# Patient Record
Sex: FEMALE | Race: WHITE
[De-identification: names, ages, dates, MRNs, and addresses within clinical notes are randomized per-mention and may not be internally consistent; named-entity substitution may affect disease eponyms.]

---

## 2019-02-17 ENCOUNTER — HOSPITAL ENCOUNTER (EMERGENCY)
Dept: HOSPITAL 97 - ER | Age: 59
Discharge: HOME | End: 2019-02-17
Payer: SELF-PAY

## 2019-02-17 DIAGNOSIS — R00.2: Primary | ICD-10-CM

## 2019-02-17 DIAGNOSIS — R10.13: ICD-10-CM

## 2019-02-17 DIAGNOSIS — F32.9: ICD-10-CM

## 2019-02-17 DIAGNOSIS — I10: ICD-10-CM

## 2019-02-17 DIAGNOSIS — F41.9: ICD-10-CM

## 2019-02-17 LAB
ALBUMIN SERPL BCP-MCNC: 4 G/DL (ref 3.4–5)
ALP SERPL-CCNC: 85 U/L (ref 45–117)
ALT SERPL W P-5'-P-CCNC: 23 U/L (ref 12–78)
AST SERPL W P-5'-P-CCNC: 16 U/L (ref 15–37)
BUN BLD-MCNC: 21 MG/DL (ref 7–18)
GLUCOSE SERPLBLD-MCNC: 99 MG/DL (ref 74–106)
HCT VFR BLD CALC: 40.1 % (ref 36–45)
INR BLD: 0.91
LIPASE SERPL-CCNC: 226 U/L (ref 73–393)
LYMPHOCYTES # SPEC AUTO: 2 K/UL (ref 0.7–4.9)
MAGNESIUM SERPL-MCNC: 2 MG/DL (ref 1.8–2.4)
NT-PROBNP SERPL-MCNC: 45 PG/ML (ref ?–125)
PMV BLD: 8.4 FL (ref 7.6–11.3)
POTASSIUM SERPL-SCNC: 3.9 MMOL/L (ref 3.5–5.1)
RBC # BLD: 3.9 M/UL (ref 3.86–4.86)
TROPONIN (EMERG DEPT USE ONLY): < 0.02 NG/ML (ref 0–0.04)

## 2019-02-17 PROCEDURE — 84484 ASSAY OF TROPONIN QUANT: CPT

## 2019-02-17 PROCEDURE — 74177 CT ABD & PELVIS W/CONTRAST: CPT

## 2019-02-17 PROCEDURE — 93005 ELECTROCARDIOGRAM TRACING: CPT

## 2019-02-17 PROCEDURE — 80048 BASIC METABOLIC PNL TOTAL CA: CPT

## 2019-02-17 PROCEDURE — 99285 EMERGENCY DEPT VISIT HI MDM: CPT

## 2019-02-17 PROCEDURE — 36415 COLL VENOUS BLD VENIPUNCTURE: CPT

## 2019-02-17 PROCEDURE — 71045 X-RAY EXAM CHEST 1 VIEW: CPT

## 2019-02-17 PROCEDURE — 83690 ASSAY OF LIPASE: CPT

## 2019-02-17 PROCEDURE — 76705 ECHO EXAM OF ABDOMEN: CPT

## 2019-02-17 PROCEDURE — 85025 COMPLETE CBC W/AUTO DIFF WBC: CPT

## 2019-02-17 PROCEDURE — 80076 HEPATIC FUNCTION PANEL: CPT

## 2019-02-17 PROCEDURE — 83880 ASSAY OF NATRIURETIC PEPTIDE: CPT

## 2019-02-17 PROCEDURE — 83735 ASSAY OF MAGNESIUM: CPT

## 2019-02-17 PROCEDURE — 85610 PROTHROMBIN TIME: CPT

## 2019-02-17 NOTE — RAD REPORT
EXAM DESCRIPTION:  Julio Single View2/17/2019 12:41 pm

 

CLINICAL HISTORY:  Chest pain

 

COMPARISON:  2017

 

FINDINGS:   The lungs appear clear of acute infiltrate. The heart is normal size

 

IMPRESSION:   No acute abnormalities displayed

## 2019-02-17 NOTE — ER
Nurse's Notes                                                                                     

 [facility]                                                                

Name: Mariya Saleh                                                                               

Age: 58 yrs                                                                                       

Sex: Female                                                                                       

: 1960                                                                                   

MRN: [id number]                                                                                   

Arrival Date: 2019                                                                          

Time: 11:51                                                                                       

Account#: [id number]                                                                            

Bed 5                                                                                             

Private MD: Parish Gilmore T                                                                        

Diagnosis: Chest pain, unspecified;Epigastric pain;Palpitations                                   

                                                                                                  

Presentation:                                                                                     

                                                                                             

12:10 Presenting complaint: Patient states: pain across chest since this morning, also has    iw  

      had palpitations for a few days, pain radiates to right jaw, into jason armpits, also c/o     

      upper abd pain. Transition of care: patient was not received from another setting of        

      care. Onset of symptoms was 2019. Risk Assessment: Do you want to hurt         

      yourself or someone else? Patient reports no desire to harm self or others. Initial         

      Sepsis Screen: Does the patient meet any 2 criteria? No. Patient's initial sepsis           

      screen is negative. Does the patient have a suspected source of infection? No.              

      Patient's initial sepsis screen is negative. Care prior to arrival: None.                   

12:10 Method Of Arrival: Wheelchair                                                           iw  

12:10 Acuity: MORIAH 2                                                                           iw  

                                                                                                  

Historical:                                                                                       

- Allergies:                                                                                      

12:12 No Known Allergies;                                                                     iw  

- Home Meds:                                                                                      

12:12 citalopram 10 mg tab 1 tab once daily [Active]; Toprol  mg Oral Tb24 1 tab once   iw  

      daily [Active]; Alprazolam Oral as needed [Active];                                         

- PMHx:                                                                                           

12:12 Anxiety; Depression; Hypertension;                                                      iw  

- PSHx:                                                                                           

12:12 Hysterectomy;                                                                           iw  

                                                                                                  

- Immunization history:: Adult Immunizations.                                                     

- Social history:: Smoking status: Patient uses tobacco products, smokes one pack                 

  cigarettes per day.                                                                             

- Ebola Screening: : Patient negative for fever greater than or equal to 101.5 degrees            

  Fahrenheit, and additional compatible Ebola Virus Disease symptoms Patient denies               

  exposure to infectious person Patient denies travel to an Ebola-affected area in the            

  21 days before illness onset No symptoms or risks identified at this time.                      

                                                                                                  

                                                                                                  

Screenin:40 Abuse screen: Denies threats or abuse. Denies injuries from another. Nutritional        ph  

      screening: No deficits noted. Tuberculosis screening: No symptoms or risk factors           

      identified. Fall Risk None identified.                                                      

                                                                                                  

Assessment:                                                                                       

12:30 General: Appears in no apparent distress. comfortable, slender, well groomed, Behavior  ph  

      is calm, cooperative, appropriate for age, Denies fever, feeling ill. Pain: Complains       

      of pain in anterior aspect of left upper chest and left breast Pain radiates to jason         

      axilla, jason jaw, jason rib area,upper abdomen and back Pain currently is 7 out of 10 on a     

      pain scale. Pain began 1 day ago. Neuro: Level of Consciousness is awake, alert, obeys      

      commands, Oriented to person, place, time, situation, Reports headache in left frontal      

      area. Cardiovascular: Reports chest pain, nausea, palpitations, shortness of breath,        

      Denies syncope, vomiting, Capillary refill < 3 seconds in bilateral fingers Patient's       

      skin is warm and dry. Rhythm is sinus rhythm Chest pain is located in left chest wall       

      radiates to bilateral arm(s) back jaw(s). Respiratory: Airway is patent Respiratory         

      effort is even, unlabored. GI: Reports nausea, Patient currently denies abdominal pain,     

      diarrhea, vomiting. Derm: Skin is intact, is healthy with good turgor, Skin is pink,        

      warm \T\ dry. Musculoskeletal: Circulation, motion, and sensation intact. Range of          

      motion: intact in all extremities.                                                          

12:46 Reassessment: Patient appears in no apparent distress at this time. Patient and/or      ph  

      family updated on plan of care and expected duration. Pain level reassessed. Patient is     

      alert, oriented x 3, equal unlabored respirations, skin warm/dry/pink. Pt reports that      

      nausea and pain have improved, states, " I only have pain in my back now." Awaiting lab     

      and radiology results, VSS Patient states feeling better.                                   

14:11 Reassessment: Patient appears in no apparent distress at this time. Patient and/or      ph  

      family updated on plan of care and expected duration. Pain level reassessed. Patient is     

      alert, oriented x 3, equal unlabored respirations, skin warm/dry/pink.                      

15:20 Reassessment: Patient appears in no apparent distress at this time. Patient and/or      ph  

      family updated on plan of care and expected duration. Pain level reassessed. Patient is     

      alert, oriented x 3, equal unlabored respirations, skin warm/dry/pink. Pt c/o pain in       

      back and jason rib area, states, " I am feeling the palpations again." EKG repeated and       

      ERP notified of pt's pain, see MAR.                                                         

15:39 Reassessment: Patient appears in no apparent distress at this time. Patient is alert,   ph  

      oriented x 3, equal unlabored respirations, skin warm/dry/pink. Hospitalist at bedside      

      to exam pt.                                                                                 

17:19 Reassessment: Patient appears in no apparent distress at this time. Patient and/or      ph  

      family updated on plan of care and expected duration. Pain level reassessed. Patient is     

      alert, oriented x 3, equal unlabored respirations, skin warm/dry/pink. Pt reports that      

      pain has improved, awaiting results of repeat cardiac enzymes, VSS.                         

                                                                                                  

Vital Signs:                                                                                      

12:12  / 93; Pulse 96; Resp 18 S; Temp 98.3; Pulse Ox 100% on R/A; Weight 58.97 kg;     iw  

      Height 5 ft. 2 in. (157.48 cm); Pain 7/10;                                                  

12:46  / 82; Pulse 85; Resp 22; Pulse Ox 96% on R/A; Pain 3/10;                         ph  

14:11  / 98; Pulse 83; Resp 18; Pulse Ox 97% on R/A;                                    ph  

15:37  / 92; Pulse 78; Resp 20; Pulse Ox 98% on R/A;                                    ph  

17:00  / 92; Pulse 78; Resp 18; Pulse Ox 99% on R/A;                                    ph  

17:54  / 83; Pulse 76; Resp 18; Pulse Ox 100% on R/A;                                   hj  

12:12 Body Mass Index 23.78 (58.97 kg, 157.48 cm)                                             iw  

                                                                                                  

ED Course:                                                                                        

11:51 Patient arrived in ED.                                                                  mr  

11:51 Parish Gilmore MD is Private Physician.                                                   mr  

11:55 Dina Maxwell, RN is Primary Nurse.                                                    ph  

12:04 Lars Joe PA is PHCP.                                                                cp  

12:04 John Pleitez MD is Attending Physician.                                              cp  

12:11 Triage completed.                                                                       iw  

12:12 Arm band placed on.                                                                     iw  

12:24 Inserted saline lock: 20 gauge in right antecubital area, using aseptic technique.      ph  

      Blood collected. Patient maintains SpO2 saturation greater than 95% on room air.            

12:41 XRAY Chest (1 view) In Process Unspecified.                                             EDMS

12:47 Patient has correct armband on for positive identification. Placed in gown. Bed in low  ph  

      position. Call light in reach. Side rails up X2. Cardiac monitor on. Pulse ox on. NIBP      

      on. Door closed. Noise minimized. Lights dimmed. Warm blanket given.                        

14:11 US Abdomen Limited: RUQ/epigastric pain In Process Unspecified.                         EDMS

14:32 CT completed. Patient tolerated procedure well. Patient moved back from CT.             kw1 

14:34 CT Abd/Pelvis - W/Contrast: no oral contrast In Process Unspecified.                    EDMS

17:47 Adi Baron MD is Referral Physician.                                                cp  

17:54 No provider procedures requiring assistance completed. IV discontinued, intact,         hj  

      bleeding controlled, No redness/swelling at site. Pressure dressing applied.                

                                                                                                  

Administered Medications:                                                                         

12:35 Drug: Zofran 4 mg Route: IVP; Site: right antecubital;                                  ph  

12:48 Follow up: Response: No adverse reaction; Nausea is decreased                           ph  

12:35 Drug: ProTONIX 40 mg Route: IVP; Site: left antecubital;                                ph  

12:49 Follow up: Response: No adverse reaction                                                ph  

12:37 Drug: morphine 2 mg Route: IVP; Site: right antecubital;                                ph  

12:49 Follow up: Response: No adverse reaction; Pain is decreased                             ph  

16:05 Not Given (Other Intervention Used; Pt took 2 at home, 2 administered in ED): Aspirin   ph  

      Chewable Tablet 324 mg PO once; 81 mg tablets x 4                                           

16:05 Drug: Nicotine 21 mg/24 hr 1 patches {Note: R upper arm.} Route: Transdermal; Site:     ph  

      affected area;                                                                              

16:06 Drug: morphine 2 mg Route: IVP; Site: right antecubital;                                ph  

17:55 Follow up: Response: No adverse reaction                                                hj  

16:06 Drug: Aspirin Chewable Tablet 162 mg Route: PO;                                         ph  

17:55 Follow up: Response: No adverse reaction; Pain is decreased                             hj  

                                                                                                  

                                                                                                  

Outcome:                                                                                          

17:48 Discharge ordered by MD.                                                                cp  

17:54 Discharged to home ambulatory, with family.                                             hj  

17:54 Condition: stable                                                                           

17:54 Discharge instructions given to patient, family, Instructed on discharge instructions,      

      follow up and referral plans. medication usage, Demonstrated understanding of               

      instructions, follow-up care, medications, Prescriptions given X 2.                         

17:57 Patient left the ED.                                                                      

                                                                                                  

Signatures:                                                                                       

Dispatcher [employer]                           [facility]                                                 

SowPatricia Irene, RN RN                                                      

Dina Maxwell RN RN ph Joaquin, Henry, RN RN hj Page, Corey, PA PA cp Wilhelm, Jackeline                            kw1                                                  

                                                                                                  

Corrections: (The following items were deleted from the chart)                                    

12:13 12:12  / 93; Pulse 96bpm; Resp 18bpm; Spontaneous; Pulse Ox 100% RA; Temp 98.3F;  iw  

      58.97 kg; Height 5 ft. 2 in.; BMI: 23.7; Pain 7/10; iw                                      

                                                                                                  

**************************************************************************************************

## 2019-02-17 NOTE — RAD REPORT
EXAM DESCRIPTION:  CT - Abdomen   Pelvis W Contrast - 2/17/2019 2:33 pm

 

CLINICAL HISTORY:  Abdominal pain with nausea.

 

COMPARISON:  none.

 

TECHNIQUE:  Computed axial tomography of the abdomen pelvis was obtained. 100 cc Isovue-300 was admin
istered intravenously. Oral contrast was not requested which limits evaluation of bowel.

 

All CT scans are performed using dose optimization technique as appropriate and may include automated
 exposure control or mA/KV adjustment according to patient size.

 

FINDINGS:  The liver, spleen, pancreas, adrenal and kidneys appear unremarkable.

 

There is no evidence of diverticulitis. The appendix is normal. Hysterectomy has been performed

 

An adnexal mass is not visualized

 

IMPRESSION:  No acute abnormality is displayed.

## 2019-02-17 NOTE — EDPHYS
Physician Documentation                                                                           

 [facility]                                                                

Name: Mariya Saleh                                                                               

Age: 58 yrs                                                                                       

Sex: Female                                                                                       

: 1960                                                                                   

MRN: [id number]                                                                                   

Arrival Date: 2019                                                                          

Time: 11:51                                                                                       

Account#: [id number]                                                                            

Bed 5                                                                                             

Private MD: Parish Gilmore T                                                                        

ED Physician John Pleitez                                                                       

HPI:                                                                                              

                                                                                             

12:16 This 58 yrs old  Female presents to ER via Wheelchair with complaints of Chest cp  

      Pain, Palpitations.                                                                         

12:16 The patient or guardian reports chest pain that is located primarily in the epigastric  cp  

      area, mid and lower chest bilateral.                                                        

12:16 Onset: this morning.                                                                    cp  

12:16 The patient presents with abdominal pain in the upper abdomen.                          cp  

12:16 The pain radiates to jaw, bilateral axilla. Associated signs and symptoms: Pertinent    cp  

      positives: nausea, palpitations, Pertinent negatives: blood in stools, constipation,        

      diarrhea, fever, shortness of breath, vomiting. The symptoms are described as constant.     

12:16 The chest pain is described as aching.                                                  cp  

                                                                                                  

Historical:                                                                                       

- Allergies:                                                                                      

12:12 No Known Allergies;                                                                     iw  

- Home Meds:                                                                                      

12:12 citalopram 10 mg tab 1 tab once daily [Active]; Toprol  mg Oral Tb24 1 tab once   iw  

      daily [Active]; Alprazolam Oral as needed [Active];                                         

- PMHx:                                                                                           

12:12 Anxiety; Depression; Hypertension;                                                      iw  

- PSHx:                                                                                           

12:12 Hysterectomy;                                                                           iw  

                                                                                                  

- Immunization history:: Adult Immunizations.                                                     

- Social history:: Smoking status: Patient uses tobacco products, smokes one pack                 

  cigarettes per day.                                                                             

- Ebola Screening: : Patient negative for fever greater than or equal to 101.5 degrees            

  Fahrenheit, and additional compatible Ebola Virus Disease symptoms Patient denies               

  exposure to infectious person Patient denies travel to an Ebola-affected area in the            

  21 days before illness onset No symptoms or risks identified at this time.                      

                                                                                                  

                                                                                                  

ROS:                                                                                              

12:19 Constitutional: Negative for body aches, chills, fever, poor PO intake.                 cp  

12:19 Eyes: Negative for injury, pain, redness, and discharge.                                cp  

12:19 ENT: Negative for drainage from ear(s), ear pain, sore throat, difficulty swallowing,       

      difficulty handling secretions.                                                             

12:19 Cardiovascular: Positive for chest pain, palpitations, Negative for edema.                  

12:19 Respiratory: Negative for cough, shortness of breath, wheezing.                             

12:19 Abdomen/GI: Positive for abdominal pain, nausea, Negative for vomiting, diarrhea,           

      constipation, black/tarry stool, rectal bleeding.                                           

12:19 Back: Positive for pain at rest, pain with movement, of the left scapular area and          

      right scapular area.                                                                        

12:19 : Negative for urinary symptoms.                                                          

12:19 Skin: Negative for cellulitis, rash.                                                        

12:19 Neuro: Negative for altered mental status, headache, syncope, near syncope, weakness.       

12:19 All other systems are negative.                                                             

                                                                                                  

Exam:                                                                                             

12:15 ECG was reviewed by the Attending Physician.                                            cp  

12:22 Constitutional: The patient appears in no acute distress, alert, awake,                 cp  

      non-diaphoretic, non-toxic, well developed, well nourished, uncomfortable.                  

12:22 Head/Face:  Normocephalic, atraumatic. Eyes:  Pupils equal round and reactive to light, cp  

      extra-ocular motions intact.  Lids and lashes normal.  Conjunctiva and sclera are           

      non-icteric and not injected.  Cornea within normal limits.  Periorbital areas with no      

      swelling, redness, or edema. ENT:  Nares patent. No nasal discharge, no septal              

      abnormalities noted.  Tympanic membranes are normal and external auditory canals are        

      clear.  Oropharynx with no redness, swelling, or masses, exudates, or evidence of           

      obstruction, uvula midline.  Mucous membranes moist. Neck:  Trachea midline, no             

      thyromegaly or masses palpated, and no cervical lymphadenopathy.  Supple, full range of     

      motion without nuchal rigidity, or vertebral point tenderness.  No Meningismus.             

      Chest/axilla:  Normal chest wall appearance and motion.  Nontender with no deformity.       

      No lesions are appreciated.                                                                 

12:22 Cardiovascular: Rate: normal, Rhythm: regular, Pulses: Pulses are 2+ in right radial        

      artery and left radial artery. Heart sounds: murmur, not appreciated, rub, not              

      appreciated, gallop, not appreciated, Edema: is not appreciated, JVD: is not                

      appreciated.                                                                                

12:22 Respiratory: the patient does not display signs of respiratory distress,  Respirations:     

      normal, no use of accessory muscles, no retractions, no splinting, no tachypnea,            

      labored breathing, is not present, Breath sounds: are clear throughout, no decreased        

      breath sounds, no stridor, no wheezing.                                                     

12:22 Abdomen/GI: Inspection: abdomen appears normal, Bowel sounds: active, all quadrants,        

      Palpation: soft, in all quadrants, moderate abdominal tenderness, in the epigastric         

      area, rebound tenderness, is not appreciated, voluntary guarding, is elicited in the        

      epigastric area.                                                                            

12:22 Back: pain, that is mild, of the  left scapular area and right scapular area.               

12:22 Musculoskeletal/extremity: Exam is negative for decreased range of motion, deformity,       

      injury.                                                                                     

12:22 Skin: cellulitis, is not appreciated, no rash present.                                      

12:22 Neuro: Orientation: to person, place \T\ time. Mentation: is normal, Cerebellar function:   

      is grossly normal, Motor: moves all fours, strength is normal, Sensation: is normal.        

                                                                                                  

Vital Signs:                                                                                      

12:12  / 93; Pulse 96; Resp 18 S; Temp 98.3; Pulse Ox 100% on R/A; Weight 58.97 kg;     iw  

      Height 5 ft. 2 in. (157.48 cm); Pain 7/10;                                                  

12:46  / 82; Pulse 85; Resp 22; Pulse Ox 96% on R/A; Pain 3/10;                         ph  

14:11  / 98; Pulse 83; Resp 18; Pulse Ox 97% on R/A;                                    ph  

15:37  / 92; Pulse 78; Resp 20; Pulse Ox 98% on R/A;                                    ph  

17:00  / 92; Pulse 78; Resp 18; Pulse Ox 99% on R/A;                                    ph  

17:54  / 83; Pulse 76; Resp 18; Pulse Ox 100% on R/A;                                   hj  

12:12 Body Mass Index 23.78 (58.97 kg, 157.48 cm)                                             iw  

                                                                                                  

MDM:                                                                                              

12:09 Patient medically screened.                                                             cp  

15:00 Data reviewed: vital signs, nurses notes, lab test result(s), EKG, radiologic studies,  cp  

      CT scan, plain films, ultrasound.                                                           

15:00 Test interpretation: by ED physician or midlevel provider: ECG, plain radiologic        cp  

      studies.                                                                                    

15:15 Physician consultation: Martha Villalpando MD was called at 15:05, was contacted at 15:05,  cp  

      regarding admission, to the telemetry unit. patient's condition, and will see patient       

      in ED, shortly.                                                                             

15:35 ED course: VSS. Patient evaluated by DR Zangana in ED and felt stable for discharge     cp  

      with outpatient f/u after repeat troponin and if troponin is negative. Does not believe     

      symptoms are cardiac related.                                                               

17:45 The patient was given aspirin in the Emergency Department.                              cp  

17:45 Counseling: I had a detailed discussion with the patient and/or guardian regarding: the cp  

      historical points, exam findings, and any diagnostic results supporting the                 

      discharge/admit diagnosis, the presence of at least one elevated blood pressure reading     

      (>120/80) during this emergency department visit, lab results, radiology results, the       

      need for outpatient follow up, a cardiologist, to return to the emergency department if     

      symptoms worsen or persist or if there are any questions or concerns that arise at          

      home, smoking cessation.                                                                    

                                                                                                  

                                                                                             

12:12 Order name: Basic Metabolic Panel; Complete Time: 13:11                                   

                                                                                             

12:12 Order name: CBC with Diff; Complete Time: 12:59                                         cp  

                                                                                             

12:59 Interpretation: Normal except: WBC 12.4; .9; ; NEUT A 8.9.                cp  

                                                                                             

12:12 Order name: LFT's; Complete Time: 13:11                                                 cp  

                                                                                             

12:12 Order name: Magnesium; Complete Time: 13:11                                             cp  

                                                                                             

12:12 Order name: NT PRO-BNP; Complete Time: 13:11                                            cp  

                                                                                             

12:12 Order name: PT-INR; Complete Time: 12:59                                                cp  

                                                                                             

12:12 Order name: Troponin (emerg Dept Use Only); Complete Time: 13:11                        cp  

                                                                                             

12:12 Order name: XRAY Chest (1 view); Complete Time: 13:11                                   cp  

                                                                                             

12:12 Order name: Lipase; Complete Time: 13:11                                                cp  

                                                                                             

13:12 Order name: US Abdomen Limited: RUQ/epigastric pain; Complete Time: 14:48               cp  

                                                                                             

14:09 Order name: CT Abd/Pelvis - W/Contrast: no oral contrast; Complete Time: 14:48          cp  

                                                                                             

16:17 Order name: Troponin I                                                                  cp  

                                                                                             

12:12 Order name: EKG; Complete Time: 12:13                                                   cp  

                                                                                             

12:12 Order name: Cardiac monitoring; Complete Time: 12:14                                    cp  

                                                                                             

12:12 Order name: EKG - Nurse/Tech; Complete Time: 12:14                                      cp  

                                                                                             

12:12 Order name: IV Saline Lock; Complete Time: 12:24                                        cp  

                                                                                             

12:12 Order name: Labs collected and sent; Complete Time: 12:24                               cp  

                                                                                             

12:12 Order name: O2 Per Protocol; Complete Time: 12:14                                       cp  

                                                                                             

12:12 Order name: O2 Sat Monitoring; Complete Time: 12:14                                     cp  

                                                                                             

15:05 Order name: Diet Regular; Complete Time: 15:05                                          cp  

                                                                                                  

EC:15 Rate is 86 beats/min. Rhythm is regular. KS interval is normal. QRS interval is normal. cp  

      QT interval is normal. T waves are Inverted in lead V2. Interpreted by me. Reviewed by      

      me.                                                                                         

                                                                                                  

Administered Medications:                                                                         

12:35 Drug: Zofran 4 mg Route: IVP; Site: right antecubital;                                  ph  

12:48 Follow up: Response: No adverse reaction; Nausea is decreased                           ph  

12:35 Drug: ProTONIX 40 mg Route: IVP; Site: left antecubital;                                ph  

12:49 Follow up: Response: No adverse reaction                                                ph  

12:37 Drug: morphine 2 mg Route: IVP; Site: right antecubital;                                ph  

12:49 Follow up: Response: No adverse reaction; Pain is decreased                             ph  

16:05 Not Given (Other Intervention Used; Pt took 2 at home, 2 administered in ED): Aspirin   ph  

      Chewable Tablet 324 mg PO once; 81 mg tablets x 4                                           

16:05 Drug: Nicotine 21 mg/24 hr 1 patches {Note: R upper arm.} Route: Transdermal; Site:     ph  

      affected area;                                                                              

16:06 Drug: morphine 2 mg Route: IVP; Site: right antecubital;                                ph  

17:55 Follow up: Response: No adverse reaction                                                hj  

16:06 Drug: Aspirin Chewable Tablet 162 mg Route: PO;                                         ph  

17:55 Follow up: Response: No adverse reaction; Pain is decreased                             hj  

                                                                                                  

                                                                                                  

Disposition:                                                                                      

18:55 Co-signature as Attending Physician, John Pleitez MD.                                    

                                                                                                  

Disposition:                                                                                      

19 17:48 Discharged to Home. Impression: Chest pain, unspecified, Epigastric pain,          

  Palpitations.                                                                                   

- Condition is Stable.                                                                            

- Discharge Instructions: Nonspecific Chest Pain, Palpitations, Aspirin and Your Heart.           

- Prescriptions for Celexa 20 mg Oral Tablet - take 0.5 tablet by ORAL route once                 

  daily; 20 tablet. Protonix 40 mg Oral Tablet - take 1 tablet by ORAL route once                 

  daily; 30 tablet.                                                                               

- Medication Reconciliation Form, Thank You Letter, Antibiotic Education, Prescription            

  Opioid Use form.                                                                                

- Follow up: Adi Baron MD; When: 2 - 3 days; Reason: chest pain.                             

- Problem is new.                                                                                 

- Symptoms have improved.                                                                         

                                                                                                  

                                                                                                  

                                                                                                  

Signatures:                                                                                       

Dispatcher [employer]                           EDKary Bagley RN                     RN   iw                                                   

Dina Maxwell RN                      RN   ph                                                   

Wilfredo Tian RN                      RN   hj                                                   

Lars [place], PA                         PA   cp                                                   

John Pleitez MD MD                                                      

                                                                                                  

Corrections: (The following items were deleted from the chart)                                    

12:59 12:59 Normal except: WBC 12.4; .9; . cp                                   cp  

17:48 17:48 2019 17:48 Discharged to Home. Impression: Chest pain, unspecified;         cp  

      Epigastric pain. Condition is Stable. Prescriptions for Celexa 20 mg Oral Tablet - take     

      0.5 tablet by ORAL route once daily; 20 tablet. and Forms are Medication Reconciliation     

      Form, Thank You Letter, Antibiotic Education, Prescription Opioid Use. Follow up: Adi Baron; When: 2 - 3 days; Reason: chest pain. Problem is new. Symptoms have improved. cp    

17:57 17:48 2019 17:48 Discharged to Home. Impression: Chest pain, unspecified;         hj  

      Epigastric pain; Palpitations. Condition is Stable. Discharge Instructions: Nonspecific     

      Chest Pain, Palpitations, Aspirin and Your Heart. Prescriptions for Celexa 20 mg Oral       

      Tablet - take 0.5 tablet by ORAL route once daily; 20 tablet, Protonix 40 mg Oral           

      Tablet - take 1 tablet by ORAL route once daily; 30 tablet. and Forms are Medication        

      Reconciliation Form, Thank You Letter, Antibiotic Education, Prescription Opioid Use.       

      Follow up: Adi Baron; When: 2 - 3 days; Reason: chest pain. Problem is new. Symptoms     

      have improved. cp                                                                           

                                                                                                  

**************************************************************************************************

## 2019-02-17 NOTE — RAD REPORT
EXAM DESCRIPTION:  US - Abdomen Exam Limited - 2/17/2019 2:10 pm

 

CLINICAL HISTORY:  Abdominal pain.

 

COMPARISON:  None.

 

FINDINGS:  The gallbladder is contracted. The gallbladder wall is not thickened. A gallstone is not s
een.

 

The common bile duct measures 6 millimeters

 

IMPRESSION:  Contracted gallbladder. Otherwise unremarkable exam

## 2019-02-18 NOTE — EKG
Test Date:    2019-02-17               Test Time:    15:24:34

Technician:   ARGELIA                                    

                                                     

MEASUREMENT RESULTS:                                       

Intervals:                                           

Rate:         73                                     

PA:           184                                    

QRSD:         78                                     

QT:           402                                    

QTc:          442                                    

Axis:                                                

P:            61                                     

PA:           184                                    

QRS:          10                                     

T:            40                                     

                                                     

INTERPRETIVE STATEMENTS:                                       

                                                     

Normal sinus rhythm

Normal ECG

Compared to ECG 02/17/2019 12:05:33

No significant changes



Electronically Signed On 02-18-19 08:51:59 CST by Adi Baron

## 2019-02-18 NOTE — EKG
Test Date:    2019-02-17               Test Time:    12:05:33

Technician:   ZARA                                    

                                                     

MEASUREMENT RESULTS:                                       

Intervals:                                           

Rate:         89                                     

HI:           174                                    

QRSD:         86                                     

QT:           378                                    

QTc:          459                                    

Axis:                                                

P:            72                                     

HI:           174                                    

QRS:          19                                     

T:            54                                     

                                                     

INTERPRETIVE STATEMENTS:                                       

                                                     

Normal sinus rhythm

Possible Left atrial enlargement

Borderline ECG

Compared to ECG 02/28/2017 19:24:35

No significant changes



Electronically Signed On 02-18-19 07:37:41 CST by Jaen Enriquez

## 2020-08-06 ENCOUNTER — HOSPITAL ENCOUNTER (EMERGENCY)
Dept: HOSPITAL 97 - ER | Age: 60
End: 2020-08-06
Payer: SELF-PAY

## 2020-08-06 VITALS — TEMPERATURE: 94.2 F

## 2020-08-06 DIAGNOSIS — I10: ICD-10-CM

## 2020-08-06 DIAGNOSIS — I46.9: Primary | ICD-10-CM

## 2020-08-06 PROCEDURE — 99285 EMERGENCY DEPT VISIT HI MDM: CPT

## 2020-08-06 PROCEDURE — 92950 HEART/LUNG RESUSCITATION CPR: CPT

## 2020-08-06 NOTE — EDPHYS
Physician Documentation                                                                           

 [facility]                                                                 

Name: Mariya Saleh                                                                               

Age: 60 yrs                                                                                       

Sex: Female                                                                                       

: 1960                                                                                   

MRN: [id number]                                                                                   

Arrival Date: 2020                                                                          

Time: 17:53                                                                                       

Account#: [id number]                                                                            

Bed 3                                                                                             

Private MD:                                                                                       

ED Physician Jose Pina                                                                       

HPI:                                                                                              

                                                                                             

17:57 This 60 yrs old  Female presents to ER via Unassigned with complaints of CPR.  kdr 

17:57 Preceding the arrest, the patient The patient was last seen well around 10-:00 AM       kdr 

      today. According to EMS/Police  found her asleep on the table. He left the house     

      to get a friend to help and when they returned, she was still unresponsive. EMS was         

      called at that time and police arrived and started CPR. When EMS arrived about 5            

      minutes after toned out, the patient was on the floor with ecchymosis around her mouth      

      and hands. Her initial rhythm was asystole. An IO and airway was established and three      

      rounds of EPI were given prior to arrival in the ED. At no time, did the patient show       

      any signs of life and was in asystole the entire time. On arrival to the ED, thumper in     

      place and the patients' pupils were fixed and dilated. She continued in asystole and        

      was pronounced at 17:45. Son was present shortly after and he was informed. The arrest      

      occurred at home. Pre-hospital course: The arrest was not witnessed by others.              

      Bystanders at the scene performed CPR. EMS care prior to arrival: initiation of ACLS,       

      peripheral IV, was successfully placed. intubation was successfully performed, orally,      

      using a 6 ET tube. oxygen, 100% by ET tube. backboard, EMS on scene time was Time           

      elapsed prior to ACLS is unknown. ACLS has been in progress for 30 minutes.                 

17:57 It is unknown whether or not the patient has had similar symptoms in the past. It is    kdr 

      unknown whether or not the patient has recently seen a physician.                           

                                                                                                  

Historical:                                                                                       

- Allergies:                                                                                      

18:08 No Known Drug Allergies;                                                                sv  

- PMHx:                                                                                           

18:08 Anxiety; Depression; Hypertension;                                                      sv  

- PSHx:                                                                                           

18:08 Hysterectomy;                                                                           sv  

                                                                                                  

                                                                                                  

                                                                                                  

ROS:                                                                                              

17:57 Constitutional: Unobtainable                                                            kdr 

17:57 Unable to obtain ROS due to comatose state, patient is on ventilator.                       

                                                                                                  

Exam:                                                                                             

17:57 Constitutional:  This is a well developed, well nourished patient who is awake, alert,  kdr 

      and in no acute distress. Head/Face:  Normocephalic, atraumatic.                            

17:57 Eyes: Pupils: are fixed and dilated.                                                        

                                                                                                  

Vital Signs:                                                                                      

18:08 Temp 94.2(R);                                                                           sv  

                                                                                                  

MDM:                                                                                              

17:57 Patient medically screened.                                                             kdr 

17:57 Data reviewed: vital signs, nurses notes. Counseling: I had a detailed discussion with  kdr 

      the patient and/or guardian regarding: the historical points, exam findings, and any        

      diagnostic results supporting the discharge/admit diagnosis.                                

                                                                                                  

Administered Medications:                                                                         

No medications were administered                                                                  

                                                                                                  

                                                                                                  

Disposition:                                                                                      

17:55 .                                                                                kdr 

                                                                                                  

Disposition:                                                                                      

Patient pronounced on 20 17:45 by Jose Pina. Impression: Cardiac arrest.               

- Released to Medical Examiner.                                                                   

                                                                                                  

                                                                                                  

                                                                                                  

Signatures:                                                                                       

Kavitha Jimenez RN                    RN                                                      

Jose Pina MD MD   [facility]                                                  

Dewayne Mckinney                            mw2                                                  

                                                                                                  

Corrections: (The following items were deleted from the chart)                                    

21:05 17:57 2020 17:57 Patient pronounced on 2020 at 17:45 by Jose Pina.     mw2 

      Impression: Cardiac arrest. Released to Medical Examiner. kdr                               

                                                                                                  

**************************************************************************************************

## 2020-08-06 NOTE — ER
Nurse's Notes                                                                                     

 [facility]                                                                 

Name: Mariya Saleh                                                                               

Age: 60 yrs                                                                                       

Sex: Female                                                                                       

: 1960                                                                                   

MRN: [id number]                                                                                   

Arrival Date: 2020                                                                          

Time: 17:53                                                                                       

Account#: [id number]                                                                            

Bed 3                                                                                             

Private MD:                                                                                       

Diagnosis: Cardiac arrest                                                                         

                                                                                                  

Presentation:                                                                                     

                                                                                             

17:43 Chief complaint: EMS states: toned out at 1711 by family, stated pt was found on the    sv  

      ground by the refrigerator, downtime was 1-5 hours. Spouse stated that today she was        

      sitting at the dinner table with a towel around her head, not feeling well. Spouse had      

      left and found her the way she was. On EMS arrival, CPR was started, noted no rigor,        

      her hands and face were purple and she had a warm neck. Pt was asystole en route, 20G L     

      AC and IO to Left leg started, NS and Epinephrine x 3 given last at 1738. Intubated         

      with 6.0 tube, 20 at teeth. Care prior to arrival: Assisted ventilation, Oral               

      intubation, CPR manually via thumper performed by EMS and is still in progress Placed       

      on backboard. IV initiated. 20 GA, in the left antecubital area, IO left leg Oxygen         

      administered. via AMBU bag. Compressions began prior to arrival.                            

17:43 Method Of Arrival: EMS: [facility]                                                       sv  

17:43 Acuity: MORIAH 1                                                                           sv  

18:10 Coronavirus screen: At this time, unable to obtain information related to travel        sv  

      outside the [place] Ebola Screen: Unable to complete the Ebola screening because:. Onset       

      of symptoms was 2020.                                                            

18:10 Risk Assessment: Do you want to hurt yourself or someone else? Unable to obtain.        sv  

                                                                                                  

Historical:                                                                                       

- Allergies:                                                                                      

18:08 No Known Drug Allergies;                                                                sv  

- PMHx:                                                                                           

18:08 Anxiety; Depression; Hypertension;                                                      sv  

- PSHx:                                                                                           

18:08 Hysterectomy;                                                                           sv  

                                                                                                  

                                                                                                  

                                                                                                  

Screenin:08 Abuse screen: Denies threats or abuse. Denies injuries from another. Nutritional        sv  

      screening: No deficits noted. Tuberculosis screening: No symptoms or risk factors           

      identified.                                                                                 

                                                                                                  

Assessment:                                                                                       

17:43 CPR assessment: unresponsive, no respiratory effort, intubated, Ambu ventilation,       sv  

      cyanotic, pale, dependent lividity noted. Cardiac rhythm is asystole. General: Behavior     

      is unresponsive. Neuro: Level of Consciousness is unresponsive. Respiratory: Airway via     

      oral intubation Respiratory effort is none. Derm: Skin is mottled.                          

17:50 Reassessment: 2 [employer] officers at the bedside.                                       sv  

18:14 Reassessment: [facility] called by myself, pt is a candidate for tissue donation. Case    sv  

      #[id number]                                                                              

                                                                                                  

Vital Signs:                                                                                      

18:08 Temp 94.2(R);                                                                           sv  

                                                                                                  

ED Course:                                                                                        

17:43 Maintain EMS IV. Gauge \T\ site: 20G L AC, IO left leg.                                   sv

17:53 Patient arrived in ED.                                                                  ds1 

17:55 Jose Pina MD is Attending Physician.                                              kdr 

17:55 Jose Pina MD is Pronouncing Provider.                                             kdr 

18:02 Kavitha Jimenez RN is Primary Nurse.                                                  sv  

18:07 Triage completed.                                                                       sv  

18:08 Patient has correct armband on for positive identification.                             sv  

18:10 Arm band placed on.                                                                     sv  

19:22 Report given to Ector RN and Nima RN.                                                sv  

19:27 Primary Nurse role handed off by Kavitha Jimenez RN                                     

                                                                                                  

Administered Medications:                                                                         

No medications were administered                                                                  

                                                                                                  

                                                                                                  

Outcome:                                                                                          

17:45 Patient :  Time of death 17:45 Pronounced by Jose Pina MD                      

17:45  Condition:                                                                          

21:05 Patient left the ED.                                                                    mw2 

                                                                                                  

Signatures:                                                                                       

Kavitha Jimenez RN                    RN                                                      

Jose Pina MD MD   [facility]                                                  

Odalys Ramirez                                ds1                                                  

Dewayne Mckinney                            mw2                                                  

                                                                                                  

**************************************************************************************************